# Patient Record
Sex: MALE | Race: WHITE | URBAN - METROPOLITAN AREA
[De-identification: names, ages, dates, MRNs, and addresses within clinical notes are randomized per-mention and may not be internally consistent; named-entity substitution may affect disease eponyms.]

---

## 2017-09-06 ENCOUNTER — TRANSFERRED RECORDS (OUTPATIENT)
Dept: HEALTH INFORMATION MANAGEMENT | Facility: CLINIC | Age: 27
End: 2017-09-06

## 2019-06-11 ENCOUNTER — PRE VISIT (OUTPATIENT)
Dept: NEPHROLOGY | Facility: CLINIC | Age: 29
End: 2019-06-11

## 2019-06-11 NOTE — TELEPHONE ENCOUNTER
Patient with history of kidney stones coming in for kidney stone prevention discussion with Dr. Best. Lithclk not available at this time.

## 2019-06-18 NOTE — PROGRESS NOTES
[facility]    Phoebe Best MD  2019     Name: Edgardo Watson  MRN: [id number]  Age: 29 year old  : 1990  Referring provider: Referred Self     Assessment and Plan:    Edgardo Watson is a 29 year old male presenting for nephrolithiasis.    # Bilateral kidney stones: Stone Type: Unknown. Seems to be asymptomatic at this time. Risk is possibly related to soy based nutrition and or seizure medications. Given his comorbidities, he is at risk if surgery is necessary but also at risk for infections, hospitalization (either med surg cares or ICU), ER visits etc related to kidney stones.  He likely has little reserve so if he passes stone or has infection related to stone it may lead to long hospital stay.  Management may be tricky, again he is asymptomatic now, it is unclear whether he would benefit from pre-emptive stone removal. The patient has had issues with surgery, specifically the anesthesia, in the past. Will continue to monitor.    - Will consider attempting a 24 hour urine collection.    - Will try and get off of or reduce the Topamax, as the medication can lead to stones.     - Will try and increase fluid intake.     Repeat 24 hour chemistries: Has not completed one.   Repeat imaging: CT scan in one year      Phoebe Best MD  [facility]  Department of Medicine  Division of Renal Disease and Hypertension  [phone]    Follow-up: Return in about 1 year (around 2020).     Reason For Visit:   Nephrolithiasis.     HPI:   Edgardo Watson is a 29 year old man with CP, non-verbal, who had routine renal imaging for follow up with [facility] (I reviewed records) that showed bilateral kidney stones.  Ultrasound done on 16 showed 10 cm kidneys bilaterally with multiple stones.  CT done same day confirmed bilateral stones, 7x8 mm is the largest on right and there were other smaller ones.   Largest on left was 3x8 mm and there were others in both poles of kidney.     He was last evaluated by Dr. Best on 8/16/16. At that time, the patient was on zonisamide and topiramate for seizure management. He was on Isosource 1.5 sandee (250 mL) and Fibersource HN (750 mL) at that time.     Since his last evaluation, the patient had an ultrasound completed that showed his kidney stones increased in size. The patient has been Epidiolex for seizure management, and the Topamax dose has been lowered to 300 mg daily. The patient currently gets approximately 1.7 liters of fluid per day, and he is fed 9 times by tube per day. The patient's fluid intake is limited because the patient will vomit from too much fluid and is then at risk for aspiration, which could lead to pneumonia. He has been receiving Botox and phenol injections every 3-4 months in his left leg to help with contracture.     Today, he presents with his parents, who provided all the information about the patient today. His parents report that it takes the patient very long to come out of even a mild anesthetic, which has caused all providers to be wary of using any type of anesthesia. The patient's parents had complaints about My Chart accessibility and the Help Desk. They were not pleased will the help and support they received.        Last imaging: Ultrasound from [facility], will request release of the report.   Last Surgery: None  Stone Free (yes or no) Right *** date ***    Left *** date ***    Family history is unknown for kidney stones.      Review of Systems:   Pertinent items are noted in HPI or as below, remainder of complete ROS is negative.      Active Medications:     Current Outpatient Medications:      cannabidiol (EPIDIOLEX) 100 MG/ML oral solution, , Disp: , Rfl:      acetaminophen (TYLENOL) 500 MG tablet, 500-1,000 mg, Disp: , Rfl:      cetirizine (ZYRTEC) 10 MG tablet, 10 mg, Disp: , Rfl:      Cholecalciferol (VITAMIN D3) 400 UNIT/ML LIQD,  daily. Take 3-5 drops by G-Tube daily., Disp: , Rfl:      diazepam (DIASTAT ACUDIAL) 10 MG GEL, Place 15 mg rectally, Disp: , Rfl:      diazepam (DIAZEPAM INTENSOL) 5 MG/ML solution, Buccal Route. Give PRN for any seizure that lasts longer than 3 minutes or any seizure clusters lasting longer than 15 minutes., Disp: , Rfl:      FIRST-OMEPRAZOLE 2 MG/ML SUSP, 15 mLs, Disp: , Rfl:      gabapentin (NEURONTIN) 250 MG/5ML solution, , Disp: , Rfl:      glycopyrrolate (ROBINUL) 1 MG tablet, 0.5 mg, Disp: , Rfl:      haloperidol (HALDOL) 0.5 MG tablet, Take 0.5 mg by mouth, Disp: , Rfl:      lamoTRIgine (LAMICTAL) 25 MG tablet, 125 mg, Disp: , Rfl:      tobramycin-dexamethasone (TOBRADEX) ophthalmic suspension, Apply or instill 1 Drop into right eye every 4 hours while awake., Disp: , Rfl:      topiramate (TOPAMAX) 100 MG tablet, 300 mg, Disp: , Rfl:      zonisamide (ZONEGRAN) 25 MG capsule, daily. Take 75 mg orally in the morning and 100 mg in the evening through G tube, Disp: , Rfl:      Allergies:   Sulfa drugs; Ascorbate; Bioflavonoids; Cephalosporins; Citrus; Insect extract; Keflex [cephalexin]; Keppra [levetiracetam]; No clinical screening - see comments; Other (do not use); Seasonal; Strawberry; Tazobactam; Latex; and Penicillins      Past Medical History:  Acne vulgaris  Calculus of kidney  Constipation  Developmental delay  Drooling  Flexion contractures  Neurogen bladder  Absence of sensation  Acute respiratory failure with hypoxia  At high risk for falls  At risk for aspiration  Seizure disorder  Chorea  Compensated respiratory alkalosis  Delayed emergence from general anesthesia  Elevated procalcitonin  GERD  Gastrointestinal tube in situ  Gastrostomy tube dependent  Incontinence  Lennox-Gastaut syndrome  Microcephaly  Muscle contracture  Neurogenic bladder  Scoliosis  Nonverbal  Poor venous access  Spastic quadriplegia  Sepsis  Tachycardia  Visual impairment  Wheelchair bound    Past Surgical History:  History  reviewed. No pertinent surgical history.    Family History:   History reviewed. No pertinent family history.      Social History:   Marital Status: Single  Smoking Status: Never Smoker    Physical Exam:  /78   Pulse 70    GENERAL APPEARANCE: alert and no distress  EYES: No scleral icterus, pupils equal  HENT: NC/AT, mouth without ulcers or lesions  Endocrine: No goiter, no moon facies  Pulmonary: Breathing comfortably.   CV: No edema and well perfused.   MS: No evidence of inflammation in joints, no muscle tenderness  SKIN: No rash, warm, dry, no cyanosis  NEURO: Mentation intact and speech normal    Laboratory:  CMP:  No lab results found.    CBC:  No lab results found.    INR:  No lab results found.    ABG:  No lab results found.     URINE STUDIES:  No lab results found.  No lab results found.    PTH:   No lab results found.    IRON STUDIES:   No lab results found.    Imaging:   Ultrasound outside of clinic, will request report.     Scribe Disclosure:  Ana Rosa REMY, am serving as a scribe to document services personally performed by Phoebe Best MD at this visit, based upon the provider's statements to me. All documentation has been reviewed by the aforementioned provider prior to being entered into the official medical record.     Roberto REMY, a scribe, prepared the chart for today's encounter.

## 2019-08-06 ENCOUNTER — OFFICE VISIT (OUTPATIENT)
Dept: NEPHROLOGY | Facility: CLINIC | Age: 29
End: 2019-08-06
Payer: MEDICARE

## 2019-08-06 VITALS — HEART RATE: 70 BPM | SYSTOLIC BLOOD PRESSURE: 108 MMHG | DIASTOLIC BLOOD PRESSURE: 78 MMHG

## 2019-08-06 DIAGNOSIS — N20.0 RECURRENT KIDNEY STONES: Primary | ICD-10-CM

## 2019-08-06 PROBLEM — K21.9 GASTROESOPHAGEAL REFLUX DISEASE: Status: ACTIVE | Noted: 2019-08-06

## 2019-08-06 PROBLEM — N31.9 NEUROGENIC BLADDER: Status: ACTIVE | Noted: 2019-08-06

## 2019-08-06 PROBLEM — G82.50 SPASTIC QUADRIPLEGIA (H): Status: ACTIVE | Noted: 2019-08-06

## 2019-08-06 PROBLEM — I87.8 POOR VENOUS ACCESS: Status: ACTIVE | Noted: 2019-08-06

## 2019-08-06 PROBLEM — J96.01 ACUTE RESPIRATORY FAILURE WITH HYPOXIA (H): Status: ACTIVE | Noted: 2017-03-19

## 2019-08-06 PROBLEM — Z91.89 AT RISK FOR ASPIRATION: Status: ACTIVE | Noted: 2019-08-06

## 2019-08-06 PROBLEM — T88.59XA DELAYED EMERGENCE FROM GENERAL ANESTHESIA: Status: ACTIVE | Noted: 2019-08-06

## 2019-08-06 PROBLEM — Z99.3 WHEELCHAIR BOUND: Status: ACTIVE | Noted: 2019-08-06

## 2019-08-06 PROBLEM — Z93.1 GASTROSTOMY TUBE DEPENDENT (H): Status: ACTIVE | Noted: 2019-02-01

## 2019-08-06 PROBLEM — R25.2 SPASTICITY: Status: ACTIVE | Noted: 2019-08-06

## 2019-08-06 PROBLEM — R32 INCONTINENCE: Status: ACTIVE | Noted: 2019-08-06

## 2019-08-06 PROBLEM — L70.0 ACNE VULGARIS: Status: ACTIVE | Noted: 2019-08-06

## 2019-08-06 PROBLEM — H54.7 VISUAL IMPAIRMENT: Status: ACTIVE | Noted: 2019-08-06

## 2019-08-06 PROBLEM — Z93.1 GASTROINTESTINAL TUBE IN SITU (H): Status: ACTIVE | Noted: 2019-08-06

## 2019-08-06 PROBLEM — Z91.81 AT HIGH RISK FOR FALLS: Status: ACTIVE | Noted: 2019-08-06

## 2019-08-06 PROBLEM — R47.01 NONVERBAL: Status: ACTIVE | Noted: 2019-08-06

## 2019-08-06 PROBLEM — R62.50 DEVELOPMENTAL DELAY: Status: ACTIVE | Noted: 2019-08-06

## 2019-08-06 PROBLEM — M41.9 SCOLIOSIS: Status: ACTIVE | Noted: 2019-08-06

## 2019-08-06 PROBLEM — M62.40 MUSCLE CONTRACTURE: Status: ACTIVE | Noted: 2019-08-06

## 2019-08-06 PROBLEM — Z86.19 H/O SEPSIS: Status: ACTIVE | Noted: 2019-08-06

## 2019-08-06 PROBLEM — R20.0 ABSENCE OF SENSATION: Status: ACTIVE | Noted: 2019-08-06

## 2019-08-06 PROBLEM — Z93.1 FEEDING BY G-TUBE (H): Status: ACTIVE | Noted: 2019-08-06

## 2019-08-06 PROBLEM — R79.89 ELEVATED PROCALCITONIN: Status: ACTIVE | Noted: 2017-03-19

## 2019-08-06 PROBLEM — R00.0 TACHYCARDIA: Status: ACTIVE | Noted: 2017-03-19

## 2019-08-06 PROBLEM — Q02 MICROCEPHALY (H): Status: ACTIVE | Noted: 2019-08-06

## 2019-08-06 PROBLEM — A41.9 SEPSIS (H): Status: ACTIVE | Noted: 2017-03-19

## 2019-08-06 PROBLEM — E87.3 COMPENSATED RESPIRATORY ALKALOSIS: Status: ACTIVE | Noted: 2018-01-20

## 2019-08-06 PROBLEM — M24.50 FLEXION CONTRACTURES: Status: ACTIVE | Noted: 2019-08-06

## 2019-08-06 PROBLEM — K59.00 CONSTIPATION: Status: ACTIVE | Noted: 2019-08-06

## 2019-08-06 PROBLEM — K11.7 DROOLING: Status: ACTIVE | Noted: 2019-08-06

## 2019-08-06 ASSESSMENT — PAIN SCALES - GENERAL: PAINLEVEL: NO PAIN (0)

## 2019-08-06 NOTE — PATIENT INSTRUCTIONS
Dear Edgardo Ortega Walter      Your were seen in the [facility].  Basic advice to avoid kidney stones  - Drink 100 ounces of fluid daily (urine volume should be 80 ounces per day)  - low sodium diet (less than 2400 mg sodium per day- 500-700 mg per meal)  - minimize processed foods  - three servings daily of food/beverage high in calcium.  Please have one high calcium food three times a day with meals - can be either 8 oz milk, 6 oz yogurt or 2 oz low sodium cheese or 8 oz calcium fortified OJ/dairy alternative)   - unsweetened flax milk is the preferred dairy alternative  ---- Total should be at least 1000 mg calcium a day from food  - Protein (meat) in moderation  - add lemon or lime to foods and beverages.  Consider 2-4 oz lemon or lime juice diluted in water.  I advise against lemonade since it is high in sugar and low in actual lemon juice.     [email or web address]    Today we discussed  - the more you could increase the fluid intake the better for kidney stones    I will request and review ultrasound report    Please get the following tests done:  CT scan in one year    Please set up appointment with:  Phoebe Best MD one year    It was a pleasure meeting with you today. Thank you for allowing me and my team the privilege of caring for you today. Please let us know if there is anything else we can do for you.    Take care!  Phoebe Best MD  Department of Medicine  Division of Renal Diseases and Hypertension  [facility]    Email: [email or web address]  You may reach a nurse by calling the urology clinic at [phone]

## 2019-08-06 NOTE — NURSING NOTE
"Chief Complaint   Patient presents with     Follow Up     kidney stone prevention       Blood pressure 108/78, pulse 70. There is no height or weight on file to calculate BMI.    Patient Active Problem List   Diagnosis     Absence of sensation     Acne vulgaris     Acute respiratory failure with hypoxia (H)     At high risk for falls     At risk for aspiration     Calculus of kidney     Seizure disorder (H)     Chorea     Compensated respiratory alkalosis     Constipation     Delayed emergence from general anesthesia     Drooling     Developmental delay     Elevated procalcitonin     Flexion contractures     Gastroesophageal reflux disease     Gastrointestinal tube in situ (H)     Gastrostomy tube dependent (H)     Incontinence     Lennox-Gastaut syndrome (H)     Microcephaly (H)     Muscle contracture     Neurogenic bladder     Scoliosis     Nonverbal     Poor venous access     Spastic quadriplegia (H)     Sepsis (H)     Spasticity     Tachycardia     Visual impairment     Microcephaly (H)     Wheelchair bound     H/O sepsis     Feeding by G-tube (H)       Allergies   Allergen Reactions     Sulfa Drugs Hives     Ascorbate Unknown     Bioflavonoids Unknown     Cephalosporins Hives     Citrus      Insect Extract Unknown     \"bug bites\"     Keflex [Cephalexin] Hives     Keppra [Levetiracetam] Unknown     No Clinical Screening - See Comments Unknown     sunlight     Other (Do Not Use)      \"sensitivities\"     Seasonal      Strawberry Other (See Comments)     Tazobactam      Other reaction(s): Rash     Latex Rash     Penicillins Rash     Patient has tolerated Zosyn in the past       Current Outpatient Medications   Medication Sig Dispense Refill     cannabidiol (EPIDIOLEX) 100 MG/ML oral solution        acetaminophen (TYLENOL) 500 MG tablet 500-1,000 mg       cetirizine (ZYRTEC) 10 MG tablet 10 mg       Cholecalciferol (VITAMIN D3) 400 UNIT/ML LIQD daily. Take 3-5 drops by G-Tube daily.       diazepam (DIASTAT ACUDIAL) " 10 MG GEL Place 15 mg rectally       diazepam (DIAZEPAM INTENSOL) 5 MG/ML solution Buccal Route. Give PRN for any seizure that lasts longer than 3 minutes or any seizure clusters lasting longer than 15 minutes.       FIRST-OMEPRAZOLE 2 MG/ML SUSP 15 mLs       gabapentin (NEURONTIN) 250 MG/5ML solution        glycopyrrolate (ROBINUL) 1 MG tablet 0.5 mg       haloperidol (HALDOL) 0.5 MG tablet Take 0.5 mg by mouth       lamoTRIgine (LAMICTAL) 25 MG tablet 125 mg       tobramycin-dexamethasone (TOBRADEX) ophthalmic suspension Apply or instill 1 Drop into right eye every 4 hours while awake.       topiramate (TOPAMAX) 100 MG tablet 300 mg       zonisamide (ZONEGRAN) 25 MG capsule daily. Take 75 mg orally in the morning and 100 mg in the evening through G tube         Social History     Tobacco Use     Smoking status: Never Smoker     Smokeless tobacco: Never Used   Substance Use Topics     Alcohol use: None     Drug use: None       Pooja Gilbert LPN  8/6/2019  2:46 PM

## 2019-08-06 NOTE — LETTER
2019       RE: Edgardo Watson  [address] [place] [address]     Dear Colleague,    Thank you for referring your patient, Edgardo Watson, to the [facility] UROLOGY AND INST FOR PROSTATE AND UROLOGIC CANCERS at [facility]. Please see a copy of my visit note below.      [facility]phrology Comprehensive Stone [facility]    Phoebe Best MD  2019     Name: Edgardo Watson  MRN: [id number]  Age: 29 year old  : 1990  Referring provider: Referred Self     Assessment and Plan:       Edgardo Watson is a 29 year old male presenting for nephrolithiasis.    # Bilateral kidney stones: Stone Type: Unknown. Seems to be asymptomatic at this time. Risk is possibly related to soy based nutrition and or seizure medications. Given his comorbidities, he is at risk if surgery is necessary but also at risk for infections, hospitalization (either med surg cares or ICU), ER visits etc related to kidney stones.  He likely has little reserve so if he passes stone or has infection related to stone it may lead to long hospital stay.  Management may be tricky, again he is asymptomatic now, it is unclear whether he would benefit from pre-emptive stone removal. The patient has had issues with surgery, specifically anesthesia, in the past. Will continue to monitor.    - Will consider attempting a 24 hour urine collection in future but given limited ability to change urine chemistries it may not be helpful.    - Will try and get off of or reduce the Topamax, as the medication can lead to stones.     - Will try and increase fluid intake.     Repeat 24 hour chemistries: Has not completed one.   Repeat imaging: CT scan and follow up in one year    30 minutes spent with Jordan and his parents and >50% spent on counseling on kidney stones.    Phoebe Best MD  [facility]  [facility]  Department of Medicine  Division of Renal Disease and  Hypertension  [phone]    Follow-up: Return in about 1 year (around 8/6/2020).     Reason For Visit:   Nephrolithiasis.     HPI:   Edgardo Watson is a 29 year old man with CP, non-verbal, who had routine renal imaging for follow up with [facility] (I reviewed records) that showed bilateral kidney stones.  Ultrasound done on 5/9/16 showed 10 cm kidneys bilaterally with multiple stones.  CT done same day confirmed bilateral stones, 7x8 mm is the largest on right and there were other smaller ones.  Largest on left was 3x8 mm and there were others in both poles of kidney.     He was last evaluated by Dr. Best on 8/16/16. At that time, the patient was on zonisamide and topiramate for seizure management. He was on Isosource 1.5 sandee (250 mL) and Fibersource HN (750 mL) via tube feeding.     Since his last evaluation, the patient had an ultrasound completed that showed his kidney stones increased in size. However, that result was reported by the patient's parents; the report from [facility] has not been received by [facility] yet to verify this results. The patient has been on Epidiolex for seizure management, and the Topamax dose has been lowered to 300 mg daily. The patient's seizures appear to be well controlled on this regimen. The patient currently gets approximately 1.7 liters of fluid per day, and he is fed 9 times by tube per day. The patient's fluid intake is limited because of the high aspiration risk if too much fluid enters the patient's stomach. The patient receives 1 can Isosource 1.5 sandee and 3 cans Fibersource daily via tube, which appears to be the same as at the last evaluation. He has been receiving Botox and phenol injections every 3-4 months in his left leg to help with contracture.     Today, he presents with his parents, who provided all the information about the patient, who is nonverbal. His parents report that it takes the patient very long to come out of even a mild anesthetic, and are  concerned about what this would mean for any procedures that may need to be completed to take care of the kidney stones. The patient also has inconsistent urinary frequency, and sometimes appears to struggle with urination. His parents are concerned about the patient experiencing pain, as he would not understand why it was happening. The patient has not had a 24 hour urine collection done previously, and his parents are not confident in their ability to complete one due to the patient's catheter. They were informed of an external condom catheter that could solve the issue, but the parents were still unsure today.    On a side note, the patient's parents had complaints about My Chart accessibility and the Help Desk. They were not pleased will the help and support they received, and would like that message to be passed on.     Last imaging: Ultrasound from [facility], will request release of the report.     Family history is unknown for kidney stones.     Review of Systems:   Pertinent items are noted in HPI or as below, remainder of complete ROS is negative.      Active Medications:     Current Outpatient Medications:      cannabidiol (EPIDIOLEX) 100 MG/ML oral solution, , Disp: , Rfl:      acetaminophen (TYLENOL) 500 MG tablet, 500-1,000 mg, Disp: , Rfl:      cetirizine (ZYRTEC) 10 MG tablet, 10 mg, Disp: , Rfl:      Cholecalciferol (VITAMIN D3) 400 UNIT/ML LIQD, daily. Take 3-5 drops by G-Tube daily., Disp: , Rfl:      diazepam (DIASTAT ACUDIAL) 10 MG GEL, Place 15 mg rectally, Disp: , Rfl:      diazepam (DIAZEPAM INTENSOL) 5 MG/ML solution, Buccal Route. Give PRN for any seizure that lasts longer than 3 minutes or any seizure clusters lasting longer than 15 minutes., Disp: , Rfl:      FIRST-OMEPRAZOLE 2 MG/ML SUSP, 15 mLs, Disp: , Rfl:      gabapentin (NEURONTIN) 250 MG/5ML solution, , Disp: , Rfl:      glycopyrrolate (ROBINUL) 1 MG tablet, 0.5 mg, Disp: , Rfl:      haloperidol (HALDOL) 0.5 MG tablet, Take 0.5 mg by  mouth, Disp: , Rfl:      lamoTRIgine (LAMICTAL) 25 MG tablet, 125 mg, Disp: , Rfl:      tobramycin-dexamethasone (TOBRADEX) ophthalmic suspension, Apply or instill 1 Drop into right eye every 4 hours while awake., Disp: , Rfl:      topiramate (TOPAMAX) 100 MG tablet, 300 mg, Disp: , Rfl:      zonisamide (ZONEGRAN) 25 MG capsule, daily. Take 75 mg orally in the morning and 100 mg in the evening through G tube, Disp: , Rfl:      Allergies:   Sulfa drugs; Ascorbate; Bioflavonoids; Cephalosporins; Citrus; Insect extract; Keflex [cephalexin]; Keppra [levetiracetam]; No clinical screening - see comments; Other (do not use); Seasonal; Strawberry; Tazobactam; Latex; and Penicillins      Past Medical History:  Acne vulgaris  Calculus of kidney  Constipation  Developmental delay  Drooling  Flexion contractures  Neurogen bladder  Absence of sensation  Acute respiratory failure with hypoxia  At high risk for falls  At risk for aspiration  Seizure disorder  Chorea  Compensated respiratory alkalosis  Delayed emergence from general anesthesia  Elevated procalcitonin  GERD  Gastrointestinal tube in situ  Gastrostomy tube dependent  Incontinence  Lennox-Gastaut syndrome  Microcephaly  Muscle contracture  Neurogenic bladder  Scoliosis  Nonverbal  Poor venous access  Spastic quadriplegia  Sepsis  Tachycardia  Visual impairment  Wheelchair bound    Past Surgical History:  History reviewed. No pertinent surgical history.  Family History:     History reviewed. No pertinent family history.      Social History:   Marital Status: Single  Smoking Status: Never Smoker  Smokeless Tobacco: Never Used    Physical Exam:  /78   Pulse 70    GENERAL APPEARANCE: alert and no distress  EYES: No scleral icterus, pupils equal  HENT: NC/AT, mouth without ulcers or lesions  Endocrine: No goiter, no moon facies  Pulmonary: Breathing comfortably.   CV: No edema and well perfused.   MS: No evidence of inflammation in joints, no muscle  tenderness  SKIN: No rash, warm, dry, no cyanosis  NEURO: Mentation intact and speech normal    Laboratory:  CMP:  No lab results found.    CBC:  No lab results found.    INR:  No lab results found.    ABG:  No lab results found.     URINE STUDIES:  No lab results found.  No lab results found.    PTH:   No lab results found.    IRON STUDIES:   No lab results found.    Imaging:   Requesting Ultrasound report from [facility].     Scribe Disclosure:  Ana Rosa REMY, am serving as a scribe to document services personally performed by Phoebe Best MD at this visit, based upon the provider's statements to me. All documentation has been reviewed by the aforementioned provider prior to being entered into the official medical record.        Again, thank you for allowing me to participate in the care of your patient.      Sincerely,    Phoebe Best MD

## 2019-08-06 NOTE — PROGRESS NOTES
[facility]    Phoebe Best MD  2019     Name: Edgardo Watson  MRN: [id number]  Age: 29 year old  : 1990  Referring provider: Referred Self     Assessment and Plan:       Edgardo Watson is a 29 year old male presenting for nephrolithiasis.    # Bilateral kidney stones: Stone Type: Unknown. Seems to be asymptomatic at this time. Risk is possibly related to soy based nutrition and or seizure medications. Given his comorbidities, he is at risk if surgery is necessary but also at risk for infections, hospitalization (either med surg cares or ICU), ER visits etc related to kidney stones.  He likely has little reserve so if he passes stone or has infection related to stone it may lead to long hospital stay.  Management may be tricky, again he is asymptomatic now, it is unclear whether he would benefit from pre-emptive stone removal. The patient has had issues with surgery, specifically anesthesia, in the past. Will continue to monitor.    - Will consider attempting a 24 hour urine collection in future but given limited ability to change urine chemistries it may not be helpful.    - Will try and get off of or reduce the Topamax, as the medication can lead to stones.     - Will try and increase fluid intake.     Repeat 24 hour chemistries: Has not completed one.   Repeat imaging: CT scan and follow up in one year    30 minutes spent with Jordan and his parents and >50% spent on counseling on kidney stones.    Phoebe Best MD  [facility]  Department of Medicine  Division of Renal Disease and Hypertension  [phone]    Follow-up: Return in about 1 year (around 2020).     Reason For Visit:   Nephrolithiasis.     HPI:   Edgardo Watson is a 29 year old man with CP, non-verbal, who had routine renal imaging for follow up with [facility] (I reviewed records) that showed bilateral kidney stones.   Ultrasound done on 5/9/16 showed 10 cm kidneys bilaterally with multiple stones.  CT done same day confirmed bilateral stones, 7x8 mm is the largest on right and there were other smaller ones.  Largest on left was 3x8 mm and there were others in both poles of kidney.     He was last evaluated by Dr. Best on 8/16/16. At that time, the patient was on zonisamide and topiramate for seizure management. He was on Isosource 1.5 sandee (250 mL) and Fibersource HN (750 mL) via tube feeding.     Since his last evaluation, the patient had an ultrasound completed that showed his kidney stones increased in size. However, that result was reported by the patient's parents; the report from [facility] has not been received by [facility] yet to verify this results. The patient has been on Epidiolex for seizure management, and the Topamax dose has been lowered to 300 mg daily. The patient's seizures appear to be well controlled on this regimen. The patient currently gets approximately 1.7 liters of fluid per day, and he is fed 9 times by tube per day. The patient's fluid intake is limited because of the high aspiration risk if too much fluid enters the patient's stomach. The patient receives 1 can Isosource 1.5 sandee and 3 cans Fibersource daily via tube, which appears to be the same as at the last evaluation. He has been receiving Botox and phenol injections every 3-4 months in his left leg to help with contracture.     Today, he presents with his parents, who provided all the information about the patient, who is nonverbal. His parents report that it takes the patient very long to come out of even a mild anesthetic, and are concerned about what this would mean for any procedures that may need to be completed to take care of the kidney stones. The patient also has inconsistent urinary frequency, and sometimes appears to struggle with urination. His parents are concerned about the patient experiencing pain, as he would not understand why it  was happening. The patient has not had a 24 hour urine collection done previously, and his parents are not confident in their ability to complete one due to the patient's catheter. They were informed of an external condom catheter that could solve the issue, but the parents were still unsure today.    On a side note, the patient's parents had complaints about My Chart accessibility and the Help Desk. They were not pleased will the help and support they received, and would like that message to be passed on.     Last imaging: Ultrasound from [facility], will request release of the report.     Family history is unknown for kidney stones.     Review of Systems:   Pertinent items are noted in HPI or as below, remainder of complete ROS is negative.      Active Medications:     Current Outpatient Medications:      cannabidiol (EPIDIOLEX) 100 MG/ML oral solution, , Disp: , Rfl:      acetaminophen (TYLENOL) 500 MG tablet, 500-1,000 mg, Disp: , Rfl:      cetirizine (ZYRTEC) 10 MG tablet, 10 mg, Disp: , Rfl:      Cholecalciferol (VITAMIN D3) 400 UNIT/ML LIQD, daily. Take 3-5 drops by G-Tube daily., Disp: , Rfl:      diazepam (DIASTAT ACUDIAL) 10 MG GEL, Place 15 mg rectally, Disp: , Rfl:      diazepam (DIAZEPAM INTENSOL) 5 MG/ML solution, Buccal Route. Give PRN for any seizure that lasts longer than 3 minutes or any seizure clusters lasting longer than 15 minutes., Disp: , Rfl:      FIRST-OMEPRAZOLE 2 MG/ML SUSP, 15 mLs, Disp: , Rfl:      gabapentin (NEURONTIN) 250 MG/5ML solution, , Disp: , Rfl:      glycopyrrolate (ROBINUL) 1 MG tablet, 0.5 mg, Disp: , Rfl:      haloperidol (HALDOL) 0.5 MG tablet, Take 0.5 mg by mouth, Disp: , Rfl:      lamoTRIgine (LAMICTAL) 25 MG tablet, 125 mg, Disp: , Rfl:      tobramycin-dexamethasone (TOBRADEX) ophthalmic suspension, Apply or instill 1 Drop into right eye every 4 hours while awake., Disp: , Rfl:      topiramate (TOPAMAX) 100 MG tablet, 300 mg, Disp: , Rfl:      zonisamide (ZONEGRAN) 25  MG capsule, daily. Take 75 mg orally in the morning and 100 mg in the evening through G tube, Disp: , Rfl:      Allergies:   Sulfa drugs; Ascorbate; Bioflavonoids; Cephalosporins; Citrus; Insect extract; Keflex [cephalexin]; Keppra [levetiracetam]; No clinical screening - see comments; Other (do not use); Seasonal; Strawberry; Tazobactam; Latex; and Penicillins      Past Medical History:  Acne vulgaris  Calculus of kidney  Constipation  Developmental delay  Drooling  Flexion contractures  Neurogen bladder  Absence of sensation  Acute respiratory failure with hypoxia  At high risk for falls  At risk for aspiration  Seizure disorder  [place]  Compensated respiratory alkalosis  Delayed emergence from general anesthesia  Elevated procalcitonin  GERD  Gastrointestinal tube in situ  Gastrostomy tube dependent  Incontinence  Lennox-Gastaut syndrome  Microcephaly  Muscle contracture  Neurogenic bladder  Scoliosis  Nonverbal  Poor venous access  Spastic quadriplegia  Sepsis  Tachycardia  Visual impairment  Wheelchair bound    Past Surgical History:  History reviewed. No pertinent surgical history.  Family History:     History reviewed. No pertinent family history.      Social History:   Marital Status: Single  Smoking Status: Never Smoker  Smokeless Tobacco: Never Used    Physical Exam:  /78   Pulse 70    GENERAL APPEARANCE: alert and no distress  EYES: No scleral icterus, pupils equal  HENT: NC/AT, mouth without ulcers or lesions  Endocrine: No goiter, no moon facies  Pulmonary: Breathing comfortably.   CV: No edema and well perfused.   MS: No evidence of inflammation in joints, no muscle tenderness  SKIN: No rash, warm, dry, no cyanosis  NEURO: Mentation intact and speech normal    Laboratory:  CMP:  No lab results found.    CBC:  No lab results found.    INR:  No lab results found.    ABG:  No lab results found.     URINE STUDIES:  No lab results found.  No lab results found.    PTH:   No lab results found.    IRON  STUDIES:   No lab results found.    Imaging:   Requesting Ultrasound report from [facility].     Scribe Disclosure:  I, Ana Rosa Malik, am serving as a scribe to document services personally performed by Phoebe Best MD at this visit, based upon the provider's statements to me. All documentation has been reviewed by the aforementioned provider prior to being entered into the official medical record.

## 2019-11-04 ENCOUNTER — HEALTH MAINTENANCE LETTER (OUTPATIENT)
Age: 29
End: 2019-11-04

## 2020-02-16 ENCOUNTER — HEALTH MAINTENANCE LETTER (OUTPATIENT)
Age: 30
End: 2020-02-16

## 2020-11-22 ENCOUNTER — HEALTH MAINTENANCE LETTER (OUTPATIENT)
Age: 30
End: 2020-11-22

## 2021-04-04 ENCOUNTER — HEALTH MAINTENANCE LETTER (OUTPATIENT)
Age: 31
End: 2021-04-04

## 2021-05-29 ENCOUNTER — RECORDS - HEALTHEAST (OUTPATIENT)
Dept: ADMINISTRATIVE | Facility: CLINIC | Age: 31
End: 2021-05-29

## 2021-05-30 ENCOUNTER — RECORDS - HEALTHEAST (OUTPATIENT)
Dept: ADMINISTRATIVE | Facility: CLINIC | Age: 31
End: 2021-05-30

## 2021-05-31 ENCOUNTER — RECORDS - HEALTHEAST (OUTPATIENT)
Dept: ADMINISTRATIVE | Facility: CLINIC | Age: 31
End: 2021-05-31

## 2021-09-19 ENCOUNTER — HEALTH MAINTENANCE LETTER (OUTPATIENT)
Age: 31
End: 2021-09-19

## 2022-04-30 ENCOUNTER — HEALTH MAINTENANCE LETTER (OUTPATIENT)
Age: 32
End: 2022-04-30

## 2022-11-20 ENCOUNTER — HEALTH MAINTENANCE LETTER (OUTPATIENT)
Age: 32
End: 2022-11-20

## 2023-06-02 ENCOUNTER — HEALTH MAINTENANCE LETTER (OUTPATIENT)
Age: 33
End: 2023-06-02

## 2024-06-22 ENCOUNTER — HEALTH MAINTENANCE LETTER (OUTPATIENT)
Age: 34
End: 2024-06-22

## 2025-08-28 ENCOUNTER — TRANSCRIBE ORDERS (OUTPATIENT)
Dept: OTHER | Age: 35
End: 2025-08-28

## 2025-08-28 DIAGNOSIS — G47.01 INSOMNIA DUE TO MEDICAL CONDITION: Primary | ICD-10-CM

## 2025-09-01 ENCOUNTER — PATIENT OUTREACH (OUTPATIENT)
Dept: CARE COORDINATION | Facility: CLINIC | Age: 35
End: 2025-09-01
Payer: MEDICARE